# Patient Record
Sex: FEMALE | Race: BLACK OR AFRICAN AMERICAN
[De-identification: names, ages, dates, MRNs, and addresses within clinical notes are randomized per-mention and may not be internally consistent; named-entity substitution may affect disease eponyms.]

---

## 2019-01-11 ENCOUNTER — HOSPITAL ENCOUNTER (EMERGENCY)
Dept: HOSPITAL 87 - ER | Age: 24
Discharge: HOME | End: 2019-01-11
Payer: MEDICAID

## 2019-01-11 VITALS — DIASTOLIC BLOOD PRESSURE: 74 MMHG | SYSTOLIC BLOOD PRESSURE: 133 MMHG

## 2019-01-11 VITALS — BODY MASS INDEX: 20.73 KG/M2 | WEIGHT: 109.79 LBS | HEIGHT: 61 IN

## 2019-01-11 DIAGNOSIS — F17.200: ICD-10-CM

## 2019-01-11 DIAGNOSIS — Z3A.09: ICD-10-CM

## 2019-01-11 DIAGNOSIS — O26.891: Primary | ICD-10-CM

## 2019-01-11 DIAGNOSIS — H92.01: ICD-10-CM

## 2019-01-11 PROCEDURE — 81025 URINE PREGNANCY TEST: CPT

## 2019-01-11 PROCEDURE — 93005 ELECTROCARDIOGRAM TRACING: CPT

## 2019-01-11 PROCEDURE — 99283 EMERGENCY DEPT VISIT LOW MDM: CPT

## 2023-06-27 ENCOUNTER — APPOINTMENT (OUTPATIENT)
Dept: PRIMARY CARE | Facility: CLINIC | Age: 28
End: 2023-06-27
Payer: MEDICAID

## 2025-01-02 ENCOUNTER — OFFICE VISIT (OUTPATIENT)
Dept: OBSTETRICS AND GYNECOLOGY | Facility: CLINIC | Age: 30
End: 2025-01-02
Payer: MEDICAID

## 2025-01-02 ENCOUNTER — LAB (OUTPATIENT)
Dept: LAB | Facility: LAB | Age: 30
End: 2025-01-02
Payer: MEDICAID

## 2025-01-02 VITALS
HEIGHT: 62 IN | DIASTOLIC BLOOD PRESSURE: 69 MMHG | BODY MASS INDEX: 23.74 KG/M2 | SYSTOLIC BLOOD PRESSURE: 115 MMHG | WEIGHT: 129 LBS

## 2025-01-02 DIAGNOSIS — Z71.1 CONCERN ABOUT STD IN FEMALE WITHOUT DIAGNOSIS: ICD-10-CM

## 2025-01-02 DIAGNOSIS — N92.6 IRREGULAR MENSES: ICD-10-CM

## 2025-01-02 DIAGNOSIS — N92.6 IRREGULAR MENSES: Primary | ICD-10-CM

## 2025-01-02 LAB
B-HCG SERPL-ACNC: <3 MIU/ML
HAV IGM SER QL: NONREACTIVE
HBV CORE IGM SER QL: NONREACTIVE
HBV SURFACE AG SERPL QL IA: NONREACTIVE
HCV AB SER QL: NONREACTIVE
HIV 1+2 AB+HIV1 P24 AG SERPL QL IA: NONREACTIVE
TREPONEMA PALLIDUM IGG+IGM AB [PRESENCE] IN SERUM OR PLASMA BY IMMUNOASSAY: NONREACTIVE

## 2025-01-02 PROCEDURE — 1036F TOBACCO NON-USER: CPT | Performed by: OBSTETRICS & GYNECOLOGY

## 2025-01-02 PROCEDURE — 87591 N.GONORRHOEAE DNA AMP PROB: CPT

## 2025-01-02 PROCEDURE — 86780 TREPONEMA PALLIDUM: CPT

## 2025-01-02 PROCEDURE — 3008F BODY MASS INDEX DOCD: CPT | Performed by: OBSTETRICS & GYNECOLOGY

## 2025-01-02 PROCEDURE — 87389 HIV-1 AG W/HIV-1&-2 AB AG IA: CPT

## 2025-01-02 PROCEDURE — 80074 ACUTE HEPATITIS PANEL: CPT

## 2025-01-02 PROCEDURE — 87491 CHLMYD TRACH DNA AMP PROBE: CPT

## 2025-01-02 PROCEDURE — 84702 CHORIONIC GONADOTROPIN TEST: CPT

## 2025-01-02 PROCEDURE — 99204 OFFICE O/P NEW MOD 45 MIN: CPT | Performed by: OBSTETRICS & GYNECOLOGY

## 2025-01-02 NOTE — PROGRESS NOTES
"Marnie Grubbs is a 29 y.o. female who presents with a chief complaint of STI Screening (Blood work and /Blood pregnancy test requested)      SUBJECTIVE  Patient presents wanting to get STD testing done.  She denies any symptoms or signs.  She has a normal discharge that she usually does.  She also had a light periods which she would like to have a sheet of hCG done.    Past Medical History:   Diagnosis Date    Other specified health status     No known health problems     No past surgical history on file.  Social History     Socioeconomic History    Marital status: Single   Tobacco Use    Smoking status: Never    Smokeless tobacco: Never   Substance and Sexual Activity    Alcohol use: Yes    Drug use: Yes     No family history on file.    OB History   No obstetric history on file.       OBJECTIVE  No Known Allergies   (Not in a hospital admission)       Review of Systems  History obtained from the patient  General ROS: negative  Psychological ROS: negative  Gastrointestinal ROS: no abdominal pain, change in bowel habits, or black or bloody stools  Musculoskeletal ROS: negative  Physical Exam  General Appearance: awake, alert, oriented, in no acute distress, well developed, well nourished, and in no acute distress  Skin: there are no suspicious lesions or rashes of concern, skin color, texture, turgor are normal; there are no bruises, rashes or lesions.  Head/Face: NCAT  Eyes: No gross abnormalities., PERRL, and EOMI  Neck: neck- supple, no mass, non-tender  Back: no pain to palpation  Abdomen: Soft, non-tender, normal bowel sounds; no bruits, organomegaly or masses.  Extremities: Extremities warm to touch, pink, with no edema.  Musculoskeletal: negative    /69 (BP Location: Left arm, Patient Position: Sitting, BP Cuff Size: Adult)   Ht 1.575 m (5' 2\")   Wt 58.5 kg (129 lb)   LMP 12/22/2024   BMI 23.59 kg/m²    Problem List Items Addressed This Visit    None  Visit Diagnoses       Irregular menses    -  " Primary    Relevant Orders    Human Chorionic Gonadotropin, Serum Quantitative    Concern about STD in female without diagnosis        Relevant Orders    C. trachomatis / N. gonorrhoeae, Amplified    Hepatitis Panel, Acute    HIV 1/2 Antigen/Antibody Screen with Reflex to Confirmation    Syphilis Screen with Reflex

## 2025-01-03 LAB
C TRACH RRNA SPEC QL NAA+PROBE: NEGATIVE
N GONORRHOEA DNA SPEC QL PROBE+SIG AMP: NEGATIVE

## 2025-02-08 ENCOUNTER — OFFICE VISIT (OUTPATIENT)
Dept: URGENT CARE | Age: 30
End: 2025-02-08
Payer: COMMERCIAL

## 2025-02-08 VITALS
OXYGEN SATURATION: 98 % | WEIGHT: 129 LBS | DIASTOLIC BLOOD PRESSURE: 61 MMHG | SYSTOLIC BLOOD PRESSURE: 96 MMHG | HEART RATE: 77 BPM | HEIGHT: 62 IN | TEMPERATURE: 98.4 F | BODY MASS INDEX: 23.74 KG/M2 | RESPIRATION RATE: 18 BRPM

## 2025-02-08 DIAGNOSIS — J02.9 PHARYNGITIS, UNSPECIFIED ETIOLOGY: ICD-10-CM

## 2025-02-08 LAB — POC RAPID STREP: NEGATIVE

## 2025-02-08 PROCEDURE — 87880 STREP A ASSAY W/OPTIC: CPT | Performed by: PHYSICIAN ASSISTANT

## 2025-02-08 PROCEDURE — 99203 OFFICE O/P NEW LOW 30 MIN: CPT | Performed by: PHYSICIAN ASSISTANT

## 2025-02-08 PROCEDURE — 1036F TOBACCO NON-USER: CPT | Performed by: PHYSICIAN ASSISTANT

## 2025-02-08 PROCEDURE — 3008F BODY MASS INDEX DOCD: CPT | Performed by: PHYSICIAN ASSISTANT

## 2025-02-08 NOTE — PROGRESS NOTES
"Subjective   Patient ID: Marnie Grubbs is a 29 y.o. female. They present today with a chief complaint of ST    Patient disposition: Home    HISTORY OF PRESENT ILLNESS:    [facility] adult presents for 4d worsening ST sx on the L radiating to the L ear. Aggravated by swallowing and sharp and moderate. Denies any other cold or flu sx, specifically denies cough, dyspnea, CP, HA, f/c/s.    Past Medical History  Allergies as of 02/08/2025    (No Known Allergies)       (Not in a hospital admission)       Past Medical History:   Diagnosis Date    Other specified health status     No known health problems       History reviewed. No pertinent surgical history.     reports that she has never smoked. She has never used smokeless tobacco. She reports current alcohol use. She reports current drug use.    Review of Systems    Negative except as documented in the History of Present Illness.                             Objective    Vitals:    02/08/25 0853   BP: 96/61   BP Location: Left arm   Patient Position: Sitting   BP Cuff Size: Adult   Pulse: 77   Resp: 18   Temp: 36.9 °C (98.4 °F)   TempSrc: Oral   SpO2: 98%   Weight: 58.5 kg (129 lb)   Height: 1.575 m (5' 2\")     No LMP recorded.      PHYSICAL EXAMINATION:    CONSTITUTIONAL: well-appearing, nontoxic         ENT:  Head and face are unremarkable and atraumatic. Mucous membranes moist.    * Oropharynx mildly erythematous, no exudate. Airway patent.    * No uvular deviation. No visible abscess.    * Lymphadenopathy absent.    * TMs nl bl.         LUNGS:  CTAB, no r/r/w.    CARDIOVASCULAR:   RRR, no m/r/g. Nl S1/S2.    ABDOMEN:  Nontender including left upper quadrant, nondistended, no acute abdomen.     MUSCULOSKELETAL: No obvious deformities. STRANGE with equal strength. Gait normal.    SKIN:   Warm and dry with no rashes.    NEURO:  Normal baseline mental status.    PSYCH: Appropriate mood and affect.         ------------------------------------------         MDM: RST- and strep PCR " sent. Clinically likely viral pharyngitis. Advised OTC Ibu PRN for sx. Will fu here PRN if worsening. If strep PCR+ will Rx appropriate abx course.        Procedures    Diagnostic study results (if any) were reviewed by Terry Acuña PA-C.    Results for orders placed or performed in visit on 02/08/25   POCT rapid strep A manually resulted   Result Value Ref Range    POC Rapid Strep Negative Negative        Assessment/Plan   Allergies, medications, history, and pertinent labs/EKGs/Imaging reviewed by Terry Acuña PA-C.     Orders and Diagnoses  Diagnoses and all orders for this visit:  Sore throat  -     POCT rapid strep A manually resulted      Medical Admin Record      Follow Up Instructions  No follow-ups on file.    Electronically signed by Terry Acuña PA-C  9:08 AM

## 2025-02-09 LAB — S PYO DNA THROAT QL NAA+PROBE: NOT DETECTED

## 2025-03-28 ENCOUNTER — APPOINTMENT (OUTPATIENT)
Facility: CLINIC | Age: 30
End: 2025-03-28
Payer: COMMERCIAL

## 2025-03-28 VITALS — DIASTOLIC BLOOD PRESSURE: 99 MMHG | SYSTOLIC BLOOD PRESSURE: 117 MMHG | BODY MASS INDEX: 23.41 KG/M2 | WEIGHT: 128 LBS

## 2025-03-28 DIAGNOSIS — N89.8 VAGINAL DISCHARGE: ICD-10-CM

## 2025-03-28 DIAGNOSIS — N92.6 IRREGULAR MENSES: Primary | ICD-10-CM

## 2025-03-28 DIAGNOSIS — E04.9 GOITER: ICD-10-CM

## 2025-03-28 LAB — PREGNANCY TEST URINE, POC: NEGATIVE

## 2025-03-28 PROCEDURE — 99214 OFFICE O/P EST MOD 30 MIN: CPT | Performed by: OBSTETRICS & GYNECOLOGY

## 2025-03-28 PROCEDURE — 81025 URINE PREGNANCY TEST: CPT | Performed by: OBSTETRICS & GYNECOLOGY

## 2025-03-28 NOTE — PROGRESS NOTES
Marnie Grubbs is a 29 y.o. female who presents with a chief complaint of Menstrual Problem (Irregular menses and hcg test)  SUBJECTIVE  She comes today with a change in her menses.  Her cycles are usually regular, she is not on contraception.  She has no change in partner.    She noted bleeding on March 1 for several days, then again March 13 to the 17.  She typically does not have bleeding twice in a month.  She did notice some mild cramping.  She has noted white discharge, no itching or odor.  No dysuria or change in bowel habits.    She has some nausea and was concerned about pregnancy although home and office pregnancy test today are negative.     She does have a new job and questions if this could be a factor.  She has known thyroid cysts, with the last ultrasound in 2020.    Past Medical History:   Diagnosis Date    Other specified health status     No known health problems     No past surgical history on file.  Social History     Socioeconomic History    Marital status: Single   Tobacco Use    Smoking status: Never    Smokeless tobacco: Never   Substance and Sexual Activity    Alcohol use: Yes    Drug use: Yes     No family history on file.    OB History   No obstetric history on file.       OBJECTIVE  No Known Allergies   (Not in a hospital admission)       Review of Systems  Negative except cycle change, white discharge.  Physical Exam  General Appearance: awake, alert, oriented, in no acute distress and well developed, well nourished.  Constitutional: Alert and in no acute distress. Well developed, well nourished.   Head and Face: Head and face: Normal.    Eyes: Normal external exam - nonicteric sclera, extraocular movements.   Neck: neck asymmetry noted. Supple. Thyroid is enlarged with palpable thyroid nodules.    Pulmonary: No respiratory distress.   Abdomen: Soft nontender; no abdominal mass palpated.   Genitourinary: External genitalia: Normal. No inguinal lymphadenopathy. Bartholin's Urethral and  Skenes Glands: Normal. Urethra: Normal.  Bladder: Normal on palpation. Vagina: Normal. Cervix: Normal.  Uterus: Normal.  Right Adnexa/parametria: Normal.  Left Adnexa/parametria: Normal.  Inspection of Perianal Area: Normal.   Neurologic: Non-focal. Grossly intact.   Psychiatric: Alert and oriented x 3. Affect normal to patient baseline. Mood: Appropriate.    BP (!) 117/99   Wt 58.1 kg (128 lb)   LMP 03/01/2025 Comment: and again on 03/13  BMI 23.41 kg/m²    Problem List Items Addressed This Visit    None  Visit Diagnoses       Irregular menses    -  Primary    Relevant Orders    POCT pregnancy, urine manually resulted (Completed)    TSH with reflex to Free T4 if abnormal    FSH & LH    Prolactin    [facility] HCG, TOTAL, QN    Goiter        Relevant Orders    US thyroid    Vaginal discharge        Relevant Orders    C. trachomatis / N. gonorrhoeae, Amplified, Urogenital    Trichomonas vaginalis, Amplified    Vaginitis Gram Stain For Bacterial Vaginosis + Yeast        This is a 29-year-old female with 1 month history of cycle change.  Reassurance was given.  We discussed her new job, there could be stress that is affecting ovulation.  She was concerned about early pregnancy as she is sexually active and has nausea.  I did repeat hCG titer.  I included prolactin, TSH, FSH and LH.    I did recommend a thyroid ultrasound due to large thyroid cysts, again noted on palpation.    I did recommend returning for her routine exam and Pap smear in 2025.

## 2025-03-29 LAB
B-HCG SERPL-ACNC: <5 MIU/ML
BV SCORE VAG QL: ABNORMAL
C TRACH RRNA SPEC QL NAA+PROBE: NOT DETECTED
FSH SERPL-ACNC: 4.8 MIU/ML
LH SERPL-ACNC: 3.5 MIU/ML
N GONORRHOEA RRNA SPEC QL NAA+PROBE: NOT DETECTED
PROLACTIN SERPL-MCNC: 3.8 NG/ML
QUEST GC CT AMPLIFIED (ALWAYS MESSAGE): NORMAL
T VAGINALIS RRNA SPEC QL NAA+PROBE: DETECTED
TSH SERPL-ACNC: 0.42 MIU/L

## 2025-03-29 RX ORDER — METRONIDAZOLE 500 MG/1
500 TABLET ORAL 2 TIMES DAILY
Qty: 14 TABLET | Refills: 0 | Status: SHIPPED | OUTPATIENT
Start: 2025-03-29 | End: 2025-04-05

## 2025-03-29 NOTE — RESULT ENCOUNTER NOTE
The swab is consistent with bacterial vaginosis.  It is a change in your normal vaginal flavio.  It is treated with an antibiotic called metronidazole.  This was ordered to your [facility] in [place].  Take 1 tablet twice a day for a week.  I recommend taking it with food, as it has a funny aftertaste.  To avoid nausea, avoid taking it with alcohol.  I will contact you with any other abnormal results.

## 2025-03-31 ENCOUNTER — APPOINTMENT (OUTPATIENT)
Dept: RADIOLOGY | Facility: CLINIC | Age: 30
End: 2025-03-31
Payer: COMMERCIAL

## 2025-03-31 ENCOUNTER — TELEPHONE (OUTPATIENT)
Facility: CLINIC | Age: 30
End: 2025-03-31
Payer: COMMERCIAL

## 2025-03-31 DIAGNOSIS — N89.8 VAGINAL DISCHARGE: ICD-10-CM

## 2025-03-31 RX ORDER — METRONIDAZOLE 500 MG/1
500 TABLET ORAL 2 TIMES DAILY
Qty: 14 TABLET | Refills: 0 | Status: SHIPPED | OUTPATIENT
Start: 2025-03-31 | End: 2025-04-07

## 2025-03-31 NOTE — TELEPHONE ENCOUNTER
"[facility] for patient that results were in and provider sent a [employer] message, patient to call if she has any questions.    \"The testing shows positive trichomonas.  Negative for chlamydia and gonorrhea.  The trichomonas is treated with the same medication as bacterial vaginosis, metronidazole.  I did refill it in case you need a refill of metronidazole.  Take 1 tablet twice a day for a week.  You will want to have your partner treated so that you do not get reinfected\"    English Coker    "

## 2025-05-16 ENCOUNTER — APPOINTMENT (OUTPATIENT)
Facility: CLINIC | Age: 30
End: 2025-05-16
Payer: COMMERCIAL

## 2025-07-07 ENCOUNTER — APPOINTMENT (OUTPATIENT)
Facility: CLINIC | Age: 30
End: 2025-07-07
Payer: COMMERCIAL

## 2025-07-18 ENCOUNTER — APPOINTMENT (OUTPATIENT)
Facility: CLINIC | Age: 30
End: 2025-07-18
Payer: COMMERCIAL

## 2025-09-08 ENCOUNTER — APPOINTMENT (OUTPATIENT)
Dept: RADIOLOGY | Facility: HOSPITAL | Age: 30
End: 2025-09-08
Payer: COMMERCIAL

## 2025-09-17 ENCOUNTER — APPOINTMENT (OUTPATIENT)
Dept: OBSTETRICS AND GYNECOLOGY | Facility: CLINIC | Age: 30
End: 2025-09-17
Payer: COMMERCIAL

## 2026-01-16 ENCOUNTER — APPOINTMENT (OUTPATIENT)
Facility: CLINIC | Age: 31
End: 2026-01-16
Payer: MEDICAID